# Patient Record
Sex: MALE | Race: WHITE | NOT HISPANIC OR LATINO | URBAN - METROPOLITAN AREA
[De-identification: names, ages, dates, MRNs, and addresses within clinical notes are randomized per-mention and may not be internally consistent; named-entity substitution may affect disease eponyms.]

---

## 2023-06-28 ENCOUNTER — OFFICE (OUTPATIENT)
Dept: URBAN - METROPOLITAN AREA CLINIC 84 | Facility: CLINIC | Age: 43
End: 2023-06-28
Payer: COMMERCIAL

## 2023-06-28 VITALS
SYSTOLIC BLOOD PRESSURE: 124 MMHG | WEIGHT: 152 LBS | HEART RATE: 70 BPM | DIASTOLIC BLOOD PRESSURE: 78 MMHG | HEIGHT: 68 IN

## 2023-06-28 DIAGNOSIS — R10.10 UPPER ABDOMINAL PAIN, UNSPECIFIED: ICD-10-CM

## 2023-06-28 DIAGNOSIS — K21.9 GASTRO-ESOPHAGEAL REFLUX DISEASE WITHOUT ESOPHAGITIS: ICD-10-CM

## 2023-06-28 DIAGNOSIS — M54.2 CERVICALGIA: ICD-10-CM

## 2023-06-28 DIAGNOSIS — F45.8 OTHER SOMATOFORM DISORDERS: ICD-10-CM

## 2023-06-28 PROCEDURE — 99204 OFFICE O/P NEW MOD 45 MIN: CPT | Performed by: NURSE PRACTITIONER

## 2023-06-28 RX ORDER — OMEPRAZOLE 40 MG/1
CAPSULE, DELAYED RELEASE ORAL
Qty: 30 | Refills: 0 | Status: ACTIVE

## 2023-08-16 ENCOUNTER — TELEHEALTH PROVIDED OTHER THAN IN PATIENT'S HOME (OUTPATIENT)
Dept: URBAN - METROPOLITAN AREA CLINIC 84 | Facility: CLINIC | Age: 43
End: 2023-08-16
Payer: COMMERCIAL

## 2023-08-16 VITALS — HEIGHT: 68 IN | WEIGHT: 150 LBS

## 2023-08-16 DIAGNOSIS — R09.89 OTHER SPECIFIED SYMPTOMS AND SIGNS INVOLVING THE CIRCULATORY: ICD-10-CM

## 2023-08-16 DIAGNOSIS — R12 HEARTBURN: ICD-10-CM

## 2023-08-16 DIAGNOSIS — R10.10 UPPER ABDOMINAL PAIN, UNSPECIFIED: ICD-10-CM

## 2023-08-16 PROCEDURE — 99214 OFFICE O/P EST MOD 30 MIN: CPT | Mod: 95 | Performed by: NURSE PRACTITIONER

## 2023-08-16 RX ORDER — OMEPRAZOLE 20 MG/1
CAPSULE, DELAYED RELEASE ORAL
Qty: 60 | Refills: 2 | Status: COMPLETED
Start: 2023-08-16 | End: 2023-12-14

## 2023-08-16 NOTE — SERVICENOTES
Telehealth Platform Used: [employer]
Location of patient: Patient is at home
Location of provider:  Provider is at her home
Other persons participating: Patient is alone

## 2024-02-02 ENCOUNTER — AMBULATORY SURGICAL CENTER (OUTPATIENT)
Dept: URBAN - METROPOLITAN AREA SURGERY 19 | Facility: SURGERY | Age: 44
End: 2024-02-02

## 2024-02-02 DIAGNOSIS — K21.9 GASTRO-ESOPHAGEAL REFLUX DISEASE WITHOUT ESOPHAGITIS: ICD-10-CM

## 2024-02-02 LAB
RELEVANT H&P ENDOSCOPY: (no result)
RELEVANT H&P ENDOSCOPY: (no result)

## 2024-02-02 PROCEDURE — 43235 EGD DIAGNOSTIC BRUSH WASH: CPT | Performed by: SPECIALIST

## 2024-02-15 ENCOUNTER — OFFICE (OUTPATIENT)
Dept: URBAN - METROPOLITAN AREA CLINIC 72 | Facility: CLINIC | Age: 44
End: 2024-02-15

## 2024-02-15 VITALS — HEIGHT: 68 IN | WEIGHT: 150 LBS

## 2024-02-15 DIAGNOSIS — K21.9 GASTRO-ESOPHAGEAL REFLUX DISEASE WITHOUT ESOPHAGITIS: ICD-10-CM

## 2024-02-15 DIAGNOSIS — M54.2 CERVICALGIA: ICD-10-CM

## 2024-02-15 PROCEDURE — 99213 OFFICE O/P EST LOW 20 MIN: CPT | Mod: 95 | Performed by: NURSE PRACTITIONER

## 2024-02-15 NOTE — SERVICENOTES
Telehealth Platform Used:  [employer]
Location of patient:  Patient is at home
Location of provider: [facility]
Other persons participating: Patient is alone

## 2024-02-15 NOTE — SERVICEHPINOTES
He was initially seen 6/28/2023brPleasant 43-year-old self-referral. He reports episode about a year ago with atypical chest pain and heartburn. He went to the ER in [place] and they didn't work up that he reports is negative. He was under a lot of stress at that time and he thought he probably had a panic attack. Over the last year he's had some continued intermittent symptoms of heartburn and globus and neck pain. The heartburn is random. He also has some pain in his shoulder that radiates to his neck that flares every 6-8 weeks in the last 1-2 days that he seems to associate with heartburn symptoms. He occasionally has some upper abdominal gurgling and discomfort. He discontinue coffee and wine 4-5 months ago. One week ago he did drink decaf coffee for several days and had a flare of his symptoms. He does not take heartburn medicine. He is taken Tums 5 or 6 times. He denies dysphagia, nausea, vomiting, weight loss, diarrhea, constipation, signs of GI bleeding.brPlanbr- CMP (Complete Metabolic Panel)br- CBC w/auto diffbr- [id number]- Patient Education risks and benefits discussedbr- omeprazole 40 mg dailyInterval history, 8/16/2023brHis symptoms completely resolved with Omeprazole 40 mg daily. He is doing well. He denies any current heartburn, upper abdominal pain, chest pain or neck pain.
brPlan
br- We will decrease Omeprazole 20 mg once a day for 1-2 weeks then to every other day for several weeks and then discontinue. 
br - If his symptoms return, we will schedule EGD at that time
br
br interval history, 2/15/2024
br 
he did wean off the omeprazole and did well for about a month and then his symptoms returned.  He started back on omeprazole 40 mg daily 12/14/2023.  He went ahead with EGDbr2/2024- EGD was completed and normal, no biopsies were taken
br today since he is about 50% better on the omeprazole.  He reports flares or episodes of chest pain.  That's in his upper mid chest that feels like a spike of discomfort.  It will radiate up to his head around his neck.  When this happens, he feels dizzy and was weird.  This may last for 2 or 3 days and then I'll go I completely.  He reports flares every few weeks.  It is worse when he works out.  He also gets gurgling noises in his upper abdomen.  He denies dysphagia, nausea, vomiting, bowel changes, signs of GI bleeding or weight loss.br 
br brLabsbr6/2023- WBC 11.2, hemoglobin 14.9, platelet 225, CMP normal
br
br procedures
br 2/2024–EGD–Dr. Wilhelm - , normal, no biopsies taken visited="true"